# Patient Record
Sex: FEMALE | Race: WHITE | NOT HISPANIC OR LATINO | Employment: OTHER | URBAN - NONMETROPOLITAN AREA
[De-identification: names, ages, dates, MRNs, and addresses within clinical notes are randomized per-mention and may not be internally consistent; named-entity substitution may affect disease eponyms.]

---

## 2023-10-20 LAB
NON-UH HIE ANION GAP: 9.9 (ref 6–15)
NON-UH HIE BASOPHILS # (AUTO): 0.1 10*3/UL (ref 0–0.2)
NON-UH HIE BASOPHILS % (AUTO): 1 %
NON-UH HIE BLOOD UREA NITROGEN: 29 MG/DL (ref 7–25)
NON-UH HIE CARBON DIOXIDE: 30.3 MMOL/L (ref 21–31)
NON-UH HIE CHLORIDE: 104 MMOL/L (ref 98–107)
NON-UH HIE CHOL/HDL RATIO: 2.9
NON-UH HIE CHOLESTEROL: 114 MG/DL (ref 140–200)
NON-UH HIE CREATININE: 1.27 MG/DL (ref 0.6–1.2)
NON-UH HIE EOSINOPHILS # (AUTO): 0.2 10*3/UL (ref 0–0.45)
NON-UH HIE EOSINOPHILS % (AUTO): 3.7 %
NON-UH HIE ESTIMATED GFR: 41.96
NON-UH HIE HDL CHOLESTEROL: 39 MG/DL (ref 23–92)
NON-UH HIE HEMATOCRIT: 34.3 % (ref 34–46.4)
NON-UH HIE HEMOGLOBIN: 11.5 G/DL (ref 11.8–15.4)
NON-UH HIE INR: 1.7
NON-UH HIE LDL CHOLESTEROL,CALCULATED: 53 MG/DL (ref 0–100)
NON-UH HIE LYMPHOCYTES # (AUTO): 1.8 10*3/UL (ref 1–4.8)
NON-UH HIE LYMPHOCYTES % (AUTO): 31.4 %
NON-UH HIE MEAN CORPUSCULAR HEMOGLOBIN: 32 PG (ref 24.7–34.3)
NON-UH HIE MEAN CORPUSCULAR HGB CONC: 33.5 G/DL (ref 32–35)
NON-UH HIE MEAN CORPUSCULAR VOLUME: 95.4 FL (ref 80–100)
NON-UH HIE MEAN PLATELET VOLUME: 8 FL (ref 6.3–10.7)
NON-UH HIE MONOCYTES # (AUTO): 0.7 10*3/UL (ref 0–0.8)
NON-UH HIE MONOCYTES % (AUTO): 13.1 %
NON-UH HIE NEUTROPHILS # (AUTO): 2.9 10*3/UL (ref 1.8–7.7)
NON-UH HIE NEUTROPHILS % (AUTO): 50.8 %
NON-UH HIE NRBC%: 0.1 /100{WBC} (ref 0–0.5)
NON-UH HIE PARTIAL THROMBOPLASTIN TIME: 34.4 S (ref 25.1–36.5)
NON-UH HIE PLATELET COUNT: 197 10*3/UL (ref 150–450)
NON-UH HIE POTASSIUM: 4.2 MMOL/L (ref 3.5–5.1)
NON-UH HIE PROTHROMBIN TIME: 19.9 S (ref 9–12.9)
NON-UH HIE RED BLOOD COUNT: 3.6 (ref 3.6–5)
NON-UH HIE RED CELL DISTRIBUTION WIDTH: 16 % (ref 11.9–15.3)
NON-UH HIE SODIUM: 140 MMOL/L (ref 136–145)
NON-UH HIE TRIGLYCERIDE W/REFLEX: 110 MG/DL (ref 0–149)
NON-UH HIE UNCORRECTED WBC: 5.7 10*3/UL (ref 3.8–11.6)
NON-UH HIE VLDL CHOLESTEROL: 22 MG/DL
NON-UH HIE WHITE BLOOD COUNT: 5.7 10*3/UL (ref 3.8–11.6)

## 2023-10-24 LAB
NON-UH HIE INR: 1.1
NON-UH HIE PARTIAL THROMBOPLASTIN TIME: 29.9 S (ref 25.1–36.5)
NON-UH HIE PROTHROMBIN TIME: 13.6 S (ref 9–12.9)

## 2023-10-27 DIAGNOSIS — I48.21 PERMANENT ATRIAL FIBRILLATION (MULTI): ICD-10-CM

## 2023-10-27 DIAGNOSIS — Z79.899 HIGH RISK MEDICATION USE: ICD-10-CM

## 2023-10-27 PROBLEM — E11.9 DIABETES MELLITUS (MULTI): Status: ACTIVE | Noted: 2023-10-27

## 2023-10-27 PROBLEM — I25.10 ARTERIOSCLEROSIS OF CORONARY ARTERY: Status: ACTIVE | Noted: 2023-10-27

## 2023-10-27 PROBLEM — I47.10 SUPRAVENTRICULAR TACHYCARDIA (CMS-HCC): Status: ACTIVE | Noted: 2023-10-27

## 2023-10-27 PROBLEM — I35.0 NONRHEUMATIC AORTIC VALVE STENOSIS: Status: ACTIVE | Noted: 2023-10-27

## 2023-10-27 PROBLEM — I49.5 SICK SINUS SYNDROME (MULTI): Status: ACTIVE | Noted: 2023-10-27

## 2023-10-27 PROBLEM — N18.30 CKD (CHRONIC KIDNEY DISEASE), STAGE III (MULTI): Status: ACTIVE | Noted: 2023-10-27

## 2023-10-27 PROBLEM — K80.50 CHOLEDOCHOLITHIASIS: Status: ACTIVE | Noted: 2023-10-27

## 2023-10-27 PROBLEM — E78.5 HYPERLIPIDEMIA: Status: ACTIVE | Noted: 2023-10-27

## 2023-10-27 PROBLEM — Z95.0 CARDIAC PACEMAKER IN SITU: Status: ACTIVE | Noted: 2023-10-27

## 2023-10-27 PROBLEM — I27.20 MODERATE PULMONARY HYPERTENSION (MULTI): Status: ACTIVE | Noted: 2023-10-27

## 2023-10-27 PROBLEM — E04.9 GOITER: Status: ACTIVE | Noted: 2023-10-27

## 2023-10-27 PROBLEM — I10 ESSENTIAL HYPERTENSION: Status: ACTIVE | Noted: 2023-10-27

## 2023-10-27 PROBLEM — I34.0 MODERATE MITRAL REGURGITATION: Status: ACTIVE | Noted: 2023-10-27

## 2023-10-27 RX ORDER — ASPIRIN 81 MG/1
81 TABLET ORAL
COMMUNITY

## 2023-10-27 RX ORDER — HYDRALAZINE HYDROCHLORIDE 25 MG/1
1 TABLET, FILM COATED ORAL 2 TIMES DAILY
COMMUNITY
End: 2023-11-20 | Stop reason: ALTCHOICE

## 2023-10-27 RX ORDER — LOSARTAN POTASSIUM 50 MG/1
1 TABLET ORAL 2 TIMES DAILY
COMMUNITY
Start: 2021-02-15 | End: 2023-11-20 | Stop reason: ALTCHOICE

## 2023-10-27 RX ORDER — METFORMIN HYDROCHLORIDE 500 MG/1
500 TABLET ORAL DAILY
COMMUNITY

## 2023-10-27 RX ORDER — WARFARIN SODIUM 4 MG/1
4 TABLET ORAL SEE ADMIN INSTRUCTIONS
COMMUNITY
Start: 2020-05-01

## 2023-10-27 RX ORDER — AMLODIPINE BESYLATE 5 MG/1
5 TABLET ORAL DAILY
COMMUNITY
End: 2023-11-20 | Stop reason: ALTCHOICE

## 2023-10-27 RX ORDER — METOPROLOL TARTRATE 25 MG/1
25 TABLET, FILM COATED ORAL 2 TIMES DAILY
COMMUNITY
End: 2023-11-20 | Stop reason: ALTCHOICE

## 2023-10-27 RX ORDER — GABAPENTIN 100 MG/1
100 CAPSULE ORAL 2 TIMES DAILY
COMMUNITY

## 2023-10-27 RX ORDER — MONTELUKAST SODIUM 10 MG/1
10 TABLET ORAL DAILY
COMMUNITY
Start: 2023-08-21 | End: 2023-11-20 | Stop reason: ALTCHOICE

## 2023-10-27 RX ORDER — LEVOTHYROXINE SODIUM 112 UG/1
112 TABLET ORAL DAILY
COMMUNITY
End: 2023-11-20 | Stop reason: SDUPTHER

## 2023-10-27 RX ORDER — URSODIOL 300 MG/1
300 CAPSULE ORAL 2 TIMES DAILY
COMMUNITY
Start: 2023-10-27

## 2023-10-27 RX ORDER — INDAPAMIDE 1.25 MG/1
1.25 TABLET ORAL DAILY
COMMUNITY
Start: 2023-09-21 | End: 2023-11-20 | Stop reason: ALTCHOICE

## 2023-10-27 RX ORDER — CLONIDINE HYDROCHLORIDE 0.1 MG/1
0.1 TABLET ORAL 2 TIMES DAILY
COMMUNITY
End: 2023-11-20 | Stop reason: ALTCHOICE

## 2023-10-27 RX ORDER — ATORVASTATIN CALCIUM 20 MG/1
1 TABLET, FILM COATED ORAL NIGHTLY
COMMUNITY
Start: 2021-03-04

## 2023-10-30 RX ORDER — LEVOTHYROXINE SODIUM 112 UG/1
112 TABLET ORAL DAILY
Qty: 90 TABLET | Refills: 3 | Status: SHIPPED | OUTPATIENT
Start: 2023-10-30

## 2023-11-20 ENCOUNTER — OFFICE VISIT (OUTPATIENT)
Dept: CARDIOLOGY | Facility: CLINIC | Age: 83
End: 2023-11-20
Payer: MEDICARE

## 2023-11-20 VITALS
BODY MASS INDEX: 32.1 KG/M2 | WEIGHT: 170 LBS | SYSTOLIC BLOOD PRESSURE: 108 MMHG | DIASTOLIC BLOOD PRESSURE: 60 MMHG | HEART RATE: 62 BPM | HEIGHT: 61 IN

## 2023-11-20 DIAGNOSIS — N18.30 STAGE 3 CHRONIC KIDNEY DISEASE, UNSPECIFIED WHETHER STAGE 3A OR 3B CKD (MULTI): ICD-10-CM

## 2023-11-20 DIAGNOSIS — I49.5 SICK SINUS SYNDROME (MULTI): ICD-10-CM

## 2023-11-20 DIAGNOSIS — I48.21 PERMANENT ATRIAL FIBRILLATION (MULTI): ICD-10-CM

## 2023-11-20 DIAGNOSIS — E78.5 HYPERLIPIDEMIA, UNSPECIFIED HYPERLIPIDEMIA TYPE: ICD-10-CM

## 2023-11-20 DIAGNOSIS — I47.10 SUPRAVENTRICULAR TACHYCARDIA (CMS-HCC): ICD-10-CM

## 2023-11-20 DIAGNOSIS — I25.10 ARTERIOSCLEROSIS OF CORONARY ARTERY: ICD-10-CM

## 2023-11-20 DIAGNOSIS — Z95.0 CARDIAC PACEMAKER IN SITU: ICD-10-CM

## 2023-11-20 DIAGNOSIS — I34.0 MODERATE MITRAL REGURGITATION: ICD-10-CM

## 2023-11-20 DIAGNOSIS — I27.20 MODERATE PULMONARY HYPERTENSION (MULTI): ICD-10-CM

## 2023-11-20 DIAGNOSIS — I42.9 CARDIOMYOPATHY, UNSPECIFIED TYPE (MULTI): ICD-10-CM

## 2023-11-20 DIAGNOSIS — I10 ESSENTIAL HYPERTENSION: ICD-10-CM

## 2023-11-20 DIAGNOSIS — I50.22 CHRONIC SYSTOLIC HEART FAILURE (MULTI): Primary | ICD-10-CM

## 2023-11-20 DIAGNOSIS — Z79.899 HIGH RISK MEDICATION USE: ICD-10-CM

## 2023-11-20 PROCEDURE — 99215 OFFICE O/P EST HI 40 MIN: CPT | Performed by: INTERNAL MEDICINE

## 2023-11-20 PROCEDURE — 3078F DIAST BP <80 MM HG: CPT | Performed by: INTERNAL MEDICINE

## 2023-11-20 PROCEDURE — 3074F SYST BP LT 130 MM HG: CPT | Performed by: INTERNAL MEDICINE

## 2023-11-20 PROCEDURE — 1036F TOBACCO NON-USER: CPT | Performed by: INTERNAL MEDICINE

## 2023-11-20 PROCEDURE — 1159F MED LIST DOCD IN RCRD: CPT | Performed by: INTERNAL MEDICINE

## 2023-11-20 RX ORDER — FAMOTIDINE 40 MG/1
1 TABLET, FILM COATED ORAL NIGHTLY
COMMUNITY

## 2023-11-20 RX ORDER — FUROSEMIDE 40 MG/1
40 TABLET ORAL DAILY
COMMUNITY

## 2023-11-20 RX ORDER — CALCIUM CARBONATE 300MG(750)
400 TABLET,CHEWABLE ORAL DAILY
COMMUNITY
End: 2024-01-31 | Stop reason: DRUGHIGH

## 2023-11-20 RX ORDER — SPIRONOLACTONE 25 MG/1
12.5 TABLET ORAL DAILY
COMMUNITY

## 2023-11-20 RX ORDER — SACUBITRIL AND VALSARTAN 49; 51 MG/1; MG/1
1 TABLET, FILM COATED ORAL 2 TIMES DAILY
COMMUNITY
End: 2024-01-31

## 2023-11-20 RX ORDER — DIPHENHYDRAMINE HCL 50 MG
50 CAPSULE ORAL DAILY PRN
COMMUNITY

## 2023-11-20 RX ORDER — METOPROLOL SUCCINATE 100 MG/1
100 TABLET, EXTENDED RELEASE ORAL DAILY
COMMUNITY

## 2023-11-20 RX ORDER — ACETAMINOPHEN 325 MG/1
2 TABLET ORAL 2 TIMES DAILY PRN
COMMUNITY

## 2023-11-20 RX ORDER — ACETAMINOPHEN, DIPHENHYDRAMINE HCL, PHENYLEPHRINE HCL 325; 25; 5 MG/1; MG/1; MG/1
TABLET ORAL AS NEEDED
COMMUNITY
End: 2024-01-31 | Stop reason: DRUGHIGH

## 2023-11-20 NOTE — PROGRESS NOTES
Subjective   Cayla De Leon is a 83 y.o. female       Chief Complaint    Hospital Follow-up          HPI   Patient is in the office after recent admission to the hospital for systolic heart failure.  EF measured 25%.  She was brought to the hospital for outpatient cardiac catheterization but could not lay down on the table.  X-ray showed pulmonary edema and she was treated for heart failure.  Her medical therapy was completely revamped and was discharged in stable condition with resolution of her volume overload.  She has been at [facility] and she is going home in 2 days.  I discussed with the patient and her daughter the need to do what we intended to do in the first place which is right and left cardiac catheterization to find out why she has severe left ventricular systolic dysfunction.  We will plan on doing that in few weeks.  In the meantime medical therapy is well-tolerated and will be left unchanged    ASSESSMENT AND PLAN:      1-permanent atrial fibrillation based on the last pacemaker check. She has sick sinus syndrome for which she had a pacemaker in place since 2020. She is currently anticoagulated with Coumadin.   2. High-risk medication with anticoagulation with no bleeding complications so far.   3. Hypertension, presently under control  4. Hyperlipidemia, on medical therapy with atorvastatin well tolerated. Lipid profile is on target with LDL around 40 mg/dL  5. Class I obesity, she is encouraged to reduce calorie consumption since her activity level is limited, she was reminded that her weight has gone up nearly 12 pounds from last visit  6. Stage III chronic kidney disease, currently stable  7. Mild coronary artery disease, cardiac catheterization 2016 revealed 40% LAD and ramus intermedius disease risk factor modifications have been in place, due to recent development of severe left ventricular systolic dysfunction and the development of significant valvular heart disease, left and right  "cardiac catheterization was recommended.  She will stop the Coumadin several days before the cardiac catheterization  8. Diabetes managed by PCP   9.  Moderate aortic stenosis, will be followed closely  10.  Severe systolic heart failure ejection fraction 25% echocardiogram October 2023 etiology is yet to be determined  11. Status post thyroid surgery on replacement therapy followed by PCP.  12-moderate mitral regurgitation based on echocardiogram 2023 to be monitored  13-moderate pulmonary hypertension, no volume overload   Modesta Carver MD, FACC      Review of Systems   All other systems reviewed and are negative.         Visit Vitals  /60 (BP Location: Left arm, Patient Position: Sitting)   Pulse 62   Ht 1.549 m (5' 1\")   Wt 77.1 kg (170 lb)   BMI 32.12 kg/m²   Smoking Status Never   BSA 1.82 m²        Objective   Physical Exam  Constitutional:       Appearance: Normal appearance. She is normal weight.   HENT:      Nose: Nose normal.   Neck:      Vascular: No carotid bruit.   Cardiovascular:      Rate and Rhythm: Normal rate. Rhythm irregular.      Pulses: Normal pulses.      Heart sounds: Normal heart sounds.   Pulmonary:      Effort: Pulmonary effort is normal.   Abdominal:      General: Bowel sounds are normal.      Palpations: Abdomen is soft.   Genitourinary:     Rectum: Normal.   Musculoskeletal:         General: Normal range of motion.      Cervical back: Normal range of motion.      Right lower leg: No edema.      Left lower leg: No edema.   Skin:     General: Skin is warm and dry.   Neurological:      General: No focal deficit present.      Mental Status: She is alert.   Psychiatric:         Mood and Affect: Mood normal.         Behavior: Behavior normal.         Thought Content: Thought content normal.         Judgment: Judgment normal.         Current Medications    Current Outpatient Medications:     acetaminophen (Tylenol) 325 mg tablet, Take 2 tablets (650 mg) by mouth 2 times a day as " needed for mild pain (1 - 3)., Disp: , Rfl:     aspirin 81 mg EC tablet, Take 1 tablet (81 mg) by mouth 1 (one) time per week., Disp: , Rfl:     atorvastatin (Lipitor) 20 mg tablet, Take 1 tablet (20 mg) by mouth once daily at bedtime., Disp: , Rfl:     diphenhydrAMINE (BENADryl) 50 mg capsule, Take 1 capsule (50 mg) by mouth once daily as needed for itching., Disp: , Rfl:     empagliflozin (Jardiance) 10 mg, Take 1 tablet (10 mg) by mouth once daily., Disp: , Rfl:     famotidine (Pepcid) 40 mg tablet, Take 1 tablet (40 mg) by mouth once daily at bedtime., Disp: , Rfl:     furosemide (Lasix) 40 mg tablet, Take 1 tablet (40 mg) by mouth once daily., Disp: , Rfl:     gabapentin (Neurontin) 100 mg capsule, Take 1 capsule (100 mg) by mouth 3 times a day., Disp: , Rfl:     Jantoven 4 mg tablet, Take 1 tablet (4 mg) by mouth see administration instructions. [place] [facility]p: , Rfl:     levothyroxine (Synthroid, Levoxyl) 112 mcg tablet, TAKE 1 TABLET BY MOUTH ONCE DAILY AS DIRECTED, Disp: 90 tablet, Rfl: 3    magnesium oxide (Mag-Ox) 400 mg tablet, 1 tablet (400 mg) once daily., Disp: , Rfl:     melatonin 10 mg tablet, Take by mouth if needed., Disp: , Rfl:     metFORMIN (Glucophage) 500 mg tablet, Take 1 tablet (500 mg) by mouth once daily. WITH A MEAL, Disp: , Rfl:     metoprolol succinate XL (Toprol-XL) 100 mg 24 hr tablet, Take 1 tablet (100 mg) by mouth once daily. Do not crush or chew., Disp: , Rfl:     multivit-min/ferrous fumarate (MULTI VITAMIN ORAL), Take 1 tablet by mouth once daily., Disp: , Rfl:     sacubitriL-valsartan (Entresto) 49-51 mg tablet, Take 1 tablet by mouth 2 times a day., Disp: , Rfl:     sitaGLIPtin phosphate (Januvia) 100 mg tablet, Take 1 tablet (100 mg) by mouth once daily., Disp: , Rfl:     spironolactone (Aldactone) 25 mg tablet, Take 0.5 tablets (12.5 mg) by mouth once daily., Disp: , Rfl:     ursodiol (Actigall) 300 mg capsule, Take 1 capsule (300 mg) by mouth 2 times a day.,  Disp: , Rfl:                      Assessment/Plan   1. Chronic systolic heart failure (CMS/HCC)        2. Arteriosclerosis of coronary artery  Follow Up In Cardiology      3. Sick sinus syndrome (CMS/HCC)  Follow Up In Cardiology      4. Cardiac pacemaker in situ  Follow Up In Cardiology      5. Permanent atrial fibrillation (CMS/HCC)  Follow Up In Cardiology      6. Supraventricular tachycardia        7. Moderate mitral regurgitation        8. Moderate pulmonary hypertension (CMS/HCC)        9. Essential hypertension        10. Hyperlipidemia, unspecified hyperlipidemia type        11. High risk medication use        12. Stage 3 chronic kidney disease, unspecified whether stage 3a or 3b CKD (CMS/HCC)        13. Cardiomyopathy, unspecified type (CMS/HCC)  Follow Up In Cardiology

## 2023-11-20 NOTE — PATIENT INSTRUCTIONS
Please bring all medicines, vitamins, and herbal supplements with you when you come to the office.    Prescriptions will not be filled unless you are compliant with your follow up appointments or have a follow up appointment scheduled as per instruction of your physician. Refills should be requested at the time of your visit.     Heart cath December- right and left  Coumadin- stop Coumadin 5 days prior  Follow up 6 months

## 2023-11-22 ENCOUNTER — APPOINTMENT (OUTPATIENT)
Dept: CARDIOLOGY | Facility: CLINIC | Age: 83
End: 2023-11-22
Payer: MEDICARE

## 2023-12-15 LAB
NON-UH HIE ANION GAP: 14 (ref 6–15)
NON-UH HIE BASOPHILS # (AUTO): 0.1 10*3/UL (ref 0–0.2)
NON-UH HIE BASOPHILS % (AUTO): 0.9 %
NON-UH HIE BLOOD UREA NITROGEN: 56 MG/DL (ref 7–25)
NON-UH HIE CARBON DIOXIDE: 25.4 MMOL/L (ref 21–31)
NON-UH HIE CHLORIDE: 103 MMOL/L (ref 98–107)
NON-UH HIE CHOL/HDL RATIO: 3.3
NON-UH HIE CHOLESTEROL: 103 MG/DL (ref 140–200)
NON-UH HIE CREATININE: 2.32 MG/DL (ref 0.6–1.2)
NON-UH HIE EOSINOPHILS # (AUTO): 0.3 10*3/UL (ref 0–0.45)
NON-UH HIE EOSINOPHILS % (AUTO): 2.9 %
NON-UH HIE ESTIMATED GFR: 20.36
NON-UH HIE HDL CHOLESTEROL: 31 MG/DL (ref 23–92)
NON-UH HIE HEMATOCRIT: 38.2 % (ref 34–46.4)
NON-UH HIE HEMOGLOBIN: 12.9 G/DL (ref 11.8–15.4)
NON-UH HIE INR: 2.8
NON-UH HIE LDL CHOLESTEROL,CALCULATED: 41 MG/DL (ref 0–100)
NON-UH HIE LYMPHOCYTES # (AUTO): 1.3 10*3/UL (ref 1–4.8)
NON-UH HIE LYMPHOCYTES % (AUTO): 15.2 %
NON-UH HIE MEAN CORPUSCULAR HEMOGLOBIN: 32.4 PG (ref 24.7–34.3)
NON-UH HIE MEAN CORPUSCULAR HGB CONC: 33.8 G/DL (ref 32–35)
NON-UH HIE MEAN CORPUSCULAR VOLUME: 96 FL (ref 80–100)
NON-UH HIE MEAN PLATELET VOLUME: 8.3 FL (ref 6.3–10.7)
NON-UH HIE MONOCYTES # (AUTO): 0.7 10*3/UL (ref 0–0.8)
NON-UH HIE MONOCYTES % (AUTO): 7.8 %
NON-UH HIE NEUTROPHILS # (AUTO): 6.5 10*3/UL (ref 1.8–7.7)
NON-UH HIE NEUTROPHILS % (AUTO): 73.2 %
NON-UH HIE NRBC%: 0.1 /100{WBC} (ref 0–0.5)
NON-UH HIE PARTIAL THROMBOPLASTIN TIME: 34.9 S (ref 25.1–36.5)
NON-UH HIE PLATELET COUNT: 270 10*3/UL (ref 150–450)
NON-UH HIE POTASSIUM: 4.4 MMOL/L (ref 3.5–5.1)
NON-UH HIE PROTHROMBIN TIME: 32.5 S (ref 9–12.9)
NON-UH HIE RED BLOOD COUNT: 3.98 (ref 3.6–5)
NON-UH HIE RED CELL DISTRIBUTION WIDTH: 16.4 % (ref 11.9–15.3)
NON-UH HIE SODIUM: 138 MMOL/L (ref 136–145)
NON-UH HIE TRIGLYCERIDE W/REFLEX: 157 MG/DL (ref 0–149)
NON-UH HIE UNCORRECTED WBC: 8.8 10*3/UL (ref 3.8–11.6)
NON-UH HIE VLDL CHOLESTEROL: 31 MG/DL
NON-UH HIE WHITE BLOOD COUNT: 8.8 10*3/UL (ref 3.8–11.6)

## 2023-12-18 ENCOUNTER — TELEPHONE (OUTPATIENT)
Dept: CARDIOLOGY | Facility: CLINIC | Age: 83
End: 2023-12-18
Payer: MEDICARE

## 2023-12-18 NOTE — TELEPHONE ENCOUNTER
PST LABS RECEIVED. REVIEWED PER DR TRIPP. RESCHEDULE CATH. PATIENT TO ARRIVE 3 HOURS EARLY FOR IV HYDRATION. SPOKE TO PATIENT ORDERS GIVEN. PATIENT  TO CONTINUE TO HOLD COUMADIN.    CREAT- 2.32  INR- 2.8    Rescheduled 12-22 at 1pm.  Patient to arrive at 10am. Message to call office.

## 2024-01-31 ENCOUNTER — OFFICE VISIT (OUTPATIENT)
Dept: CARDIOLOGY | Facility: CLINIC | Age: 84
End: 2024-01-31
Payer: MEDICARE

## 2024-01-31 VITALS
WEIGHT: 170 LBS | HEART RATE: 68 BPM | BODY MASS INDEX: 32.1 KG/M2 | HEIGHT: 61 IN | SYSTOLIC BLOOD PRESSURE: 104 MMHG | DIASTOLIC BLOOD PRESSURE: 62 MMHG

## 2024-01-31 DIAGNOSIS — Z95.0 CARDIAC PACEMAKER IN SITU: ICD-10-CM

## 2024-01-31 DIAGNOSIS — I49.5 SICK SINUS SYNDROME (MULTI): ICD-10-CM

## 2024-01-31 DIAGNOSIS — E78.5 HYPERLIPIDEMIA, UNSPECIFIED HYPERLIPIDEMIA TYPE: ICD-10-CM

## 2024-01-31 DIAGNOSIS — I25.10 ARTERIOSCLEROSIS OF CORONARY ARTERY: ICD-10-CM

## 2024-01-31 DIAGNOSIS — I48.21 PERMANENT ATRIAL FIBRILLATION (MULTI): ICD-10-CM

## 2024-01-31 DIAGNOSIS — I35.0 NONRHEUMATIC AORTIC VALVE STENOSIS: ICD-10-CM

## 2024-01-31 DIAGNOSIS — N18.30 STAGE 3 CHRONIC KIDNEY DISEASE, UNSPECIFIED WHETHER STAGE 3A OR 3B CKD (MULTI): ICD-10-CM

## 2024-01-31 DIAGNOSIS — I50.22 CHRONIC SYSTOLIC HEART FAILURE (MULTI): ICD-10-CM

## 2024-01-31 DIAGNOSIS — I42.9 CARDIOMYOPATHY, UNSPECIFIED TYPE (MULTI): ICD-10-CM

## 2024-01-31 DIAGNOSIS — I10 ESSENTIAL HYPERTENSION: ICD-10-CM

## 2024-01-31 PROCEDURE — 99214 OFFICE O/P EST MOD 30 MIN: CPT | Performed by: INTERNAL MEDICINE

## 2024-01-31 PROCEDURE — 3074F SYST BP LT 130 MM HG: CPT | Performed by: INTERNAL MEDICINE

## 2024-01-31 PROCEDURE — 3078F DIAST BP <80 MM HG: CPT | Performed by: INTERNAL MEDICINE

## 2024-01-31 PROCEDURE — 1036F TOBACCO NON-USER: CPT | Performed by: INTERNAL MEDICINE

## 2024-01-31 RX ORDER — INSULIN DEGLUDEC 200 U/ML
INJECTION, SOLUTION SUBCUTANEOUS
COMMUNITY
Start: 2024-01-11

## 2024-01-31 RX ORDER — VALSARTAN 40 MG/1
40 TABLET ORAL DAILY
Qty: 90 TABLET | Refills: 3 | Status: SHIPPED | OUTPATIENT
Start: 2024-01-31 | End: 2025-01-30

## 2024-01-31 RX ORDER — DIPHENHYDRAMINE HCL 25 MG
25 TABLET ORAL AS NEEDED
COMMUNITY

## 2024-01-31 RX ORDER — MAGNESIUM 250 MG
1 TABLET ORAL DAILY
COMMUNITY

## 2024-01-31 ASSESSMENT — ENCOUNTER SYMPTOMS: LIGHT-HEADEDNESS: 1

## 2024-01-31 NOTE — PATIENT INSTRUCTIONS
Please bring all medicines, vitamins, and herbal supplements with you when you come to the office.    Prescriptions will not be filled unless you are compliant with your follow up appointments or have a follow up appointment scheduled as per instruction of your physician. Refills should be requested at the time of your visit.      Stop Entresto  Start Valsartan 40 mg daily  Follow up 6 months

## 2024-01-31 NOTE — PROGRESS NOTES
Subjective   Cayla De Leon is a 83 y.o. female       Chief Complaint    Follow-up          HPI   Patient is in the office with her daughter for follow-up for having cardiac catheterization last month.  Her right heart catheterization revealed only mild pulmonary hypertension left heart catheterization revealed minimal coronary artery disease ejection fraction 50%.  The patient is doing much better than previously and denies any dyspnea orthopnea PND or lower extremity edema.  Her examination today was only remarkable for obesity and a murmur consistent with aortic stenosis.  The findings of the cardiac catheterization was shared with the patient and her daughter.    ASSESSMENT AND PLAN:      1-permanent atrial fibrillation based on the last pacemaker check. She has sick sinus syndrome for which she had a pacemaker in place since 2020. She is currently anticoagulated with Coumadin.   2. High-risk medication with anticoagulation with no bleeding complications so far.   3. Hypertension, presently under control  4. Hyperlipidemia, on medical therapy with atorvastatin well tolerated. Lipid profile is on target with LDL around 40 mg/dL  5. Class I obesity, she is encouraged to reduce calorie consumption since her activity level is limited, she was reminded that her weight has gone up nearly 12 pounds from last visit  6. Stage III chronic kidney disease, currently stable  7. Mild coronary artery disease, cardiac catheterization December 20243, continue baby aspirin once a week and statin therapy  8. Diabetes managed by PCP currently under control  9.  Moderate aortic stenosis, will be followed closely  10.  Mild LV systolic dysfunction, EF 50% by cardiac catheterization December 2023.  She cannot afford Entresto therefore she will be placed on valsartan 40 mg daily.  11. Status post thyroid surgery on replacement therapy followed by PCP.    Modesta Carver MD, FACC   Review of Systems   Neurological:  Positive for  "light-headedness.          Visit Vitals  /62 (BP Location: Left arm, Patient Position: Sitting)   Pulse 68   Ht 1.549 m (5' 1\")   Wt 77.1 kg (170 lb)   BMI 32.12 kg/m²   Smoking Status Never   BSA 1.82 m²        Objective   Physical Exam  Constitutional:       Appearance: Normal appearance. She is normal weight.   HENT:      Nose: Nose normal.   Neck:      Vascular: No carotid bruit.   Cardiovascular:      Rate and Rhythm: Normal rate.      Pulses: Normal pulses.      Heart sounds: Murmur heard.      Systolic murmur is present with a grade of 2/6.   Pulmonary:      Effort: Pulmonary effort is normal.   Abdominal:      General: Bowel sounds are normal.      Palpations: Abdomen is soft.   Genitourinary:     Rectum: Normal.   Musculoskeletal:         General: Normal range of motion.      Cervical back: Normal range of motion.      Right lower leg: No edema.      Left lower leg: No edema.   Skin:     General: Skin is warm and dry.   Neurological:      General: No focal deficit present.      Mental Status: She is alert.   Psychiatric:         Mood and Affect: Mood normal.         Behavior: Behavior normal.         Thought Content: Thought content normal.         Judgment: Judgment normal.         Current Medications    Current Outpatient Medications:     acetaminophen (Tylenol) 325 mg tablet, Take 2 tablets (650 mg) by mouth 2 times a day as needed for mild pain (1 - 3)., Disp: , Rfl:     aspirin 81 mg EC tablet, Take 1 tablet (81 mg) by mouth 1 (one) time per week., Disp: , Rfl:     atorvastatin (Lipitor) 20 mg tablet, Take 1 tablet (20 mg) by mouth once daily at bedtime., Disp: , Rfl:     diphenhydrAMINE (Allergy) 25 mg tablet, Take 1 tablet (25 mg) by mouth if needed for sleep., Disp: , Rfl:     diphenhydrAMINE (BENADryl) 50 mg capsule, Take 1 capsule (50 mg) by mouth once daily as needed for itching., Disp: , Rfl:     famotidine (Pepcid) 40 mg tablet, Take 1 tablet (40 mg) by mouth once daily at bedtime., Disp: " , Rfl:     furosemide (Lasix) 40 mg tablet, Take 1 tablet (40 mg) by mouth once daily., Disp: , Rfl:     gabapentin (Neurontin) 100 mg capsule, Take 1 capsule (100 mg) by mouth 2 times a day., Disp: , Rfl:     Jantoven 4 mg tablet, Take 1 tablet (4 mg) by mouth see administration instructions. As directed per [facility], Disp: , Rfl:     levothyroxine (Synthroid, Levoxyl) 112 mcg tablet, TAKE 1 TABLET BY MOUTH ONCE DAILY AS DIRECTED, Disp: 90 tablet, Rfl: 3    magnesium 250 mg tablet, Take 1 tablet (250 mg) by mouth once daily., Disp: , Rfl:     metFORMIN (Glucophage) 500 mg tablet, Take 1 tablet (500 mg) by mouth once daily. WITH A MEAL, Disp: , Rfl:     metoprolol succinate XL (Toprol-XL) 100 mg 24 hr tablet, Take 1 tablet (100 mg) by mouth once daily. Do not crush or chew., Disp: , Rfl:     multivit-min/ferrous fumarate (MULTI VITAMIN ORAL), Take 1 tablet by mouth once daily., Disp: , Rfl:     spironolactone (Aldactone) 25 mg tablet, Take 0.5 tablets (12.5 mg) by mouth once daily., Disp: , Rfl:     Tresiba FlexTouch U-200 200 unit/mL (3 mL) injection, INJECT 10 - 20 UNITS SUBCUTANEOUSLY ONCE DAILY, Disp: , Rfl:     ursodiol (Actigall) 300 mg capsule, Take 1 capsule (300 mg) by mouth 2 times a day., Disp: , Rfl:     valsartan (Diovan) 40 mg tablet, Take 1 tablet (40 mg) by mouth once daily., Disp: 90 tablet, Rfl: 3                     Assessment/Plan   1. Chronic systolic heart failure (CMS/HCC)  valsartan (Diovan) 40 mg tablet    Follow Up In Cardiology      2. Cardiomyopathy, unspecified type (CMS/HCC)        3. Arteriosclerosis of coronary artery        4. Sick sinus syndrome (CMS/HCC)        5. Cardiac pacemaker in situ        6. Essential hypertension        7. Permanent atrial fibrillation (CMS/HCC)        8. Hyperlipidemia, unspecified hyperlipidemia type        9. Nonrheumatic aortic valve stenosis        10. Moderate mitral regurgitation        11. Moderate pulmonary hypertension (CMS/HCC)         12. Stage 3 chronic kidney disease, unspecified whether stage 3a or 3b CKD (CMS/HCC)           Scribe Attestation  By signing my name below, Ipatsy Scribe   attest that this documentation has been prepared under the direction and in the presence of Modesta Carver MD.

## 2024-01-31 NOTE — LETTER
January 31, 2024     Amish Bartlett DO  [address]  [facility]  [place] [address]    Patient: Cayla De Leon   YOB: 1940   Date of Visit: 1/31/2024       Dear Dr. Amish Bartlett, :    Thank you for referring Cayla De Leon to me for evaluation. Below are my notes for this consultation.  If you have questions, please do not hesitate to call me. I look forward to following your patient along with you.       Sincerely,     Modesta Carver MD      CC: No Recipients  ______________________________________________________________________________________    Subjective   Cayla De Leon is a 83 y.o. female       Chief Complaint    Follow-up          HPI   Patient is in the office with her daughter for follow-up for having cardiac catheterization last month.  Her right heart catheterization revealed only mild pulmonary hypertension left heart catheterization revealed minimal coronary artery disease ejection fraction 50%.  The patient is doing much better than previously and denies any dyspnea orthopnea PND or lower extremity edema.  Her examination today was only remarkable for obesity and a murmur consistent with aortic stenosis.  The findings of the cardiac catheterization was shared with the patient and her daughter.    ASSESSMENT AND PLAN:      1-permanent atrial fibrillation based on the last pacemaker check. She has sick sinus syndrome for which she had a pacemaker in place since 2020. She is currently anticoagulated with Coumadin.   2. High-risk medication with anticoagulation with no bleeding complications so far.   3. Hypertension, presently under control  4. Hyperlipidemia, on medical therapy with atorvastatin well tolerated. Lipid profile is on target with LDL around 40 mg/dL  5. Class I obesity, she is encouraged to reduce calorie consumption since her activity level is limited, she was reminded that her weight has gone up nearly 12 pounds from last visit  6.  "Stage III chronic kidney disease, currently stable  7. Mild coronary artery disease, cardiac catheterization December 20243, continue baby aspirin once a week and statin therapy  8. Diabetes managed by PCP currently under control  9.  Moderate aortic stenosis, will be followed closely  10.  Mild LV systolic dysfunction, EF 50% by cardiac catheterization December 2023.  She cannot afford Entresto therefore she will be placed on valsartan 40 mg daily.  11. Status post thyroid surgery on replacement therapy followed by PCP.    Modesta Carver MD, [facility]   Review of Systems   Neurological:  Positive for light-headedness.          Visit Vitals  /62 (BP Location: Left arm, Patient Position: Sitting)   Pulse 68   Ht 1.549 m (5' 1\")   Wt 77.1 kg (170 lb)   BMI 32.12 kg/m²   Smoking Status Never   BSA 1.82 m²        Objective   Physical Exam  Constitutional:       Appearance: Normal appearance. She is normal weight.   HENT:      Nose: Nose normal.   Neck:      Vascular: No carotid bruit.   Cardiovascular:      Rate and Rhythm: Normal rate.      Pulses: Normal pulses.      Heart sounds: Murmur heard.      Systolic murmur is present with a grade of 2/6.   Pulmonary:      Effort: Pulmonary effort is normal.   Abdominal:      General: Bowel sounds are normal.      Palpations: Abdomen is soft.   Genitourinary:     Rectum: Normal.   Musculoskeletal:         General: Normal range of motion.      Cervical back: Normal range of motion.      Right lower leg: No edema.      Left lower leg: No edema.   Skin:     General: Skin is warm and dry.   Neurological:      General: No focal deficit present.      Mental Status: She is alert.   Psychiatric:         Mood and Affect: Mood normal.         Behavior: Behavior normal.         Thought Content: Thought content normal.         Judgment: Judgment normal.         Current Medications    Current Outpatient Medications:   •  acetaminophen (Tylenol) 325 mg tablet, Take 2 tablets (650 mg) " by mouth 2 times a day as needed for mild pain (1 - 3)., Disp: , Rfl:   •  aspirin 81 mg EC tablet, Take 1 tablet (81 mg) by mouth 1 (one) time per week., Disp: , Rfl:   •  atorvastatin (Lipitor) 20 mg tablet, Take 1 tablet (20 mg) by mouth once daily at bedtime., Disp: , Rfl:   •  diphenhydrAMINE (Allergy) 25 mg tablet, Take 1 tablet (25 mg) by mouth if needed for sleep., Disp: , Rfl:   •  diphenhydrAMINE (BENADryl) 50 mg capsule, Take 1 capsule (50 mg) by mouth once daily as needed for itching., Disp: , Rfl:   •  famotidine (Pepcid) 40 mg tablet, Take 1 tablet (40 mg) by mouth once daily at bedtime., Disp: , Rfl:   •  furosemide (Lasix) 40 mg tablet, Take 1 tablet (40 mg) by mouth once daily., Disp: , Rfl:   •  gabapentin (Neurontin) 100 mg capsule, Take 1 capsule (100 mg) by mouth 2 times a day., Disp: , Rfl:   •  Jantoven 4 mg tablet, Take 1 tablet (4 mg) by mouth see administration instructions. As directed per [facility], Disp: , Rfl:   •  levothyroxine (Synthroid, Levoxyl) 112 mcg tablet, TAKE 1 TABLET BY MOUTH ONCE DAILY AS DIRECTED, Disp: 90 tablet, Rfl: 3  •  magnesium 250 mg tablet, Take 1 tablet (250 mg) by mouth once daily., Disp: , Rfl:   •  metFORMIN (Glucophage) 500 mg tablet, Take 1 tablet (500 mg) by mouth once daily. WITH A MEAL, Disp: , Rfl:   •  metoprolol succinate XL (Toprol-XL) 100 mg 24 hr tablet, Take 1 tablet (100 mg) by mouth once daily. Do not crush or chew., Disp: , Rfl:   •  multivit-min/ferrous fumarate (MULTI VITAMIN ORAL), Take 1 tablet by mouth once daily., Disp: , Rfl:   •  spironolactone (Aldactone) 25 mg tablet, Take 0.5 tablets (12.5 mg) by mouth once daily., Disp: , Rfl:   •  Tresiba FlexTouch U-200 200 unit/mL (3 mL) injection, INJECT 10 - 20 UNITS SUBCUTANEOUSLY ONCE DAILY, Disp: , Rfl:   •  ursodiol (Actigall) 300 mg capsule, Take 1 capsule (300 mg) by mouth 2 times a day., Disp: , Rfl:   •  valsartan (Diovan) 40 mg tablet, Take 1 tablet (40 mg) by mouth once  daily., Disp: 90 tablet, Rfl: 3                     Assessment/Plan   1. Chronic systolic heart failure (CMS/HCC)  valsartan (Diovan) 40 mg tablet    Follow Up In Cardiology      2. Cardiomyopathy, unspecified type (CMS/HCC)        3. Arteriosclerosis of coronary artery        4. Sick sinus syndrome (CMS/HCC)        5. Cardiac pacemaker in situ        6. Essential hypertension        7. Permanent atrial fibrillation (CMS/HCC)        8. Hyperlipidemia, unspecified hyperlipidemia type        9. Nonrheumatic aortic valve stenosis        10. Moderate mitral regurgitation        11. Moderate pulmonary hypertension (CMS/HCC)        12. Stage 3 chronic kidney disease, unspecified whether stage 3a or 3b CKD (CMS/HCC)           Scribe Attestation  By signing my name below, I [email or web address] , Scrrajiv   attest that this documentation has been prepared under the direction and in the presence of Modesta Carver MD.

## 2024-06-05 ENCOUNTER — APPOINTMENT (OUTPATIENT)
Dept: CARDIOLOGY | Facility: CLINIC | Age: 84
End: 2024-06-05
Payer: MEDICARE

## 2024-06-14 DIAGNOSIS — E78.2 MIXED HYPERLIPIDEMIA: ICD-10-CM

## 2024-06-14 DIAGNOSIS — Z79.899 HIGH RISK MEDICATION USE: ICD-10-CM

## 2024-06-14 DIAGNOSIS — I10 ESSENTIAL HYPERTENSION: ICD-10-CM

## 2024-06-14 DIAGNOSIS — I48.21 PERMANENT ATRIAL FIBRILLATION (MULTI): ICD-10-CM

## 2024-06-14 NOTE — TELEPHONE ENCOUNTER
Patient phoned for refills of medications. Stated the Clonidine and Amlodipine were discontinued in the fall of 2023 by Dr. Modesta Carver MD. She was recently inpatient for a right hip fracture and went to [facility] rehab where they reinstated both those medications. She is wanting refills of both. States was on Clonidine 0.1mg daily and Amlodipine 5mg daily. Please advise.   She is asking for refill for Synthroid to which is ready for signature.     To Dr. Modesta Carver MD

## 2024-06-18 RX ORDER — LEVOTHYROXINE SODIUM 112 UG/1
112 TABLET ORAL DAILY
Qty: 90 TABLET | Refills: 3 | Status: SHIPPED | OUTPATIENT
Start: 2024-06-18 | End: 2025-06-18

## 2024-06-18 RX ORDER — AMLODIPINE BESYLATE 5 MG/1
5 TABLET ORAL DAILY
Qty: 90 TABLET | Refills: 3 | Status: SHIPPED | OUTPATIENT
Start: 2024-06-18 | End: 2025-06-18

## 2024-06-18 RX ORDER — ATORVASTATIN CALCIUM 20 MG/1
20 TABLET, FILM COATED ORAL NIGHTLY
Qty: 90 TABLET | Refills: 3 | Status: SHIPPED | OUTPATIENT
Start: 2024-06-18 | End: 2025-06-18

## 2024-06-18 RX ORDER — CLONIDINE HYDROCHLORIDE 0.1 MG/1
0.1 TABLET ORAL DAILY
Qty: 90 TABLET | Refills: 3 | Status: SHIPPED | OUTPATIENT
Start: 2024-06-18 | End: 2025-06-18

## 2024-06-18 NOTE — TELEPHONE ENCOUNTER
Patient called back advised, needs rx's sent. Rx prepped.   Also requesting additional refills on active meds

## 2024-07-31 ENCOUNTER — TELEPHONE (OUTPATIENT)
Dept: CARDIOLOGY | Facility: CLINIC | Age: 84
End: 2024-07-31

## 2024-07-31 ENCOUNTER — APPOINTMENT (OUTPATIENT)
Dept: CARDIOLOGY | Facility: CLINIC | Age: 84
End: 2024-07-31
Payer: MEDICARE

## 2024-07-31 VITALS
BODY MASS INDEX: 30.4 KG/M2 | SYSTOLIC BLOOD PRESSURE: 132 MMHG | HEART RATE: 74 BPM | DIASTOLIC BLOOD PRESSURE: 80 MMHG | WEIGHT: 161 LBS | HEIGHT: 61 IN

## 2024-07-31 DIAGNOSIS — I34.0 MODERATE MITRAL REGURGITATION: ICD-10-CM

## 2024-07-31 DIAGNOSIS — E78.5 HYPERLIPIDEMIA, UNSPECIFIED HYPERLIPIDEMIA TYPE: ICD-10-CM

## 2024-07-31 DIAGNOSIS — I10 ESSENTIAL HYPERTENSION: ICD-10-CM

## 2024-07-31 DIAGNOSIS — I42.9 CARDIOMYOPATHY, UNSPECIFIED TYPE (MULTI): ICD-10-CM

## 2024-07-31 DIAGNOSIS — Z95.0 CARDIAC PACEMAKER IN SITU: ICD-10-CM

## 2024-07-31 DIAGNOSIS — I48.21 PERMANENT ATRIAL FIBRILLATION (MULTI): ICD-10-CM

## 2024-07-31 DIAGNOSIS — I50.22 CHRONIC SYSTOLIC HEART FAILURE (MULTI): ICD-10-CM

## 2024-07-31 DIAGNOSIS — I35.0 NONRHEUMATIC AORTIC VALVE STENOSIS: ICD-10-CM

## 2024-07-31 DIAGNOSIS — I25.10 ARTERIOSCLEROSIS OF CORONARY ARTERY: ICD-10-CM

## 2024-07-31 DIAGNOSIS — I27.20 MODERATE PULMONARY HYPERTENSION (MULTI): ICD-10-CM

## 2024-07-31 DIAGNOSIS — Z78.9 NEVER SMOKED TOBACCO: ICD-10-CM

## 2024-07-31 DIAGNOSIS — I49.5 SICK SINUS SYNDROME (MULTI): ICD-10-CM

## 2024-07-31 PROCEDURE — 3075F SYST BP GE 130 - 139MM HG: CPT | Performed by: INTERNAL MEDICINE

## 2024-07-31 PROCEDURE — 1159F MED LIST DOCD IN RCRD: CPT | Performed by: INTERNAL MEDICINE

## 2024-07-31 PROCEDURE — 1036F TOBACCO NON-USER: CPT | Performed by: INTERNAL MEDICINE

## 2024-07-31 PROCEDURE — 99214 OFFICE O/P EST MOD 30 MIN: CPT | Performed by: INTERNAL MEDICINE

## 2024-07-31 PROCEDURE — 3078F DIAST BP <80 MM HG: CPT | Performed by: INTERNAL MEDICINE

## 2024-07-31 RX ORDER — GABAPENTIN 300 MG/1
300 CAPSULE ORAL NIGHTLY
COMMUNITY

## 2024-07-31 RX ORDER — VALSARTAN 40 MG/1
40 TABLET ORAL 2 TIMES DAILY
Qty: 180 TABLET | Refills: 3 | Status: SHIPPED | OUTPATIENT
Start: 2024-07-31 | End: 2025-07-31

## 2024-07-31 RX ORDER — WARFARIN 1 MG/1
1 TABLET ORAL
COMMUNITY

## 2024-07-31 ASSESSMENT — ENCOUNTER SYMPTOMS: DYSPNEA ON EXERTION: 1

## 2024-07-31 NOTE — LETTER
July 31, 2024     Amish Bartlett DO  [address]  [facility]  [place] [address]    Patient: Cayla De Leon   YOB: 1940   Date of Visit: 7/31/2024       Dear Dr. Amish Bartlett, :    Thank you for referring Cayla De Leon to me for evaluation. Below are my notes for this consultation.  If you have questions, please do not hesitate to call me. I look forward to following your patient along with you.       Sincerely,     Modesta Carver MD      CC: No Recipients  ______________________________________________________________________________________    Subjective   Cayla De Leon is a 84 y.o. female       Chief Complaint    Follow-up          HPI   Patient is in the office accompanied by her daughter after recent admission to [facility] for Coumadin toxicity without any bleeding complications but by the way was found to have slight elevation of the BNP and a troponin for which she was admitted to the hospital and had an echocardiogram which revealed ejection fraction of 35% with severe aortic stenosis and a peak velocity of 416 cm/s which is an increase from her last study from October 2023.  She had a cardiac catheterization December 2023 by myself which revealed no significant coronary artery disease.  The patient has symptoms of fatigue dyspnea on exertion and exercise intolerance due to the combination of aortic stenosis and the left ventricular systolic dysfunction.  She was advised to get a TAVR and will be formally referred to structural heart disease at [facility].  Today her examination was remarkable for clear lungs and no lower extremity edema, she has chronic atrial fibrillation with irregular rhythm that is under control and has a systolic murmur consistent with aortic stenosis.  Renal function is normal and she has no underlying pulmonary disease.  No previous strokes.  Her pacemaker has been functioning properly.  She is currently  "anticoagulated    ASSESSMENT AND PLAN:      1-permanent atrial fibrillation based on the last pacemaker check. She has sick sinus syndrome for which she had a pacemaker in place since 2020. She is currently anticoagulated with Coumadin.   2.  Severe aortic stenosis based on echocardiogram July 2024 with velocity exceeding 416 cm/s.  The patient qualifies for valve replacement and will likely benefit from a TAVR.  She will be referred to  structural heart disease for evaluation, since she had a cardiac catheterization in December 2023 I do not think that they would request another cardiac cath  3. Hypertension, presently under control  4. Hyperlipidemia, on medical therapy with atorvastatin well tolerated. Lipid profile is on target with LDL around 40 mg/dL  5.  Severe left ventricular systolic dysfunction ejection fraction 30-35% by echo July 2024.  Will increase valsartan up to 40 mg twice daily while will leave her on the rest of her medications.  Expect improvement after TAVR  6.  Generalized fatigue and lack of stamina related to valvular heart disease and left ventricular systolic dysfunction, expect improvement following valve replacement  7. Mild coronary artery disease, cardiac catheterization December 20243, continue baby aspirin once a week and statin therapy  8.  Type II diabetes managed by PCP currently under control  9. Status post thyroid surgery on replacement therapy followed by PCP.  10.  Status post pacemaker followed at the pacemaker clinic at [facility]  Review of Systems   Constitutional: Positive for malaise/fatigue.   Cardiovascular:  Positive for dyspnea on exertion.   All other systems reviewed and are negative.           Vitals:    07/31/24 1047 07/31/24 1129   BP: 142/70 132/80   BP Location: Right arm    Patient Position: Sitting    Pulse: 74    Weight: 73 kg (161 lb)    Height: 1.549 m (5' 1\")         Objective   Physical Exam  Constitutional:       Appearance: Normal appearance. "   HENT:      Nose: Nose normal.   Neck:      Vascular: No carotid bruit.   Cardiovascular:      Rate and Rhythm: Normal rate. Rhythm irregularly irregular.      Pulses: Normal pulses.      Heart sounds: Murmur heard.      Systolic murmur is present with a grade of 2/6.   Pulmonary:      Effort: Pulmonary effort is normal.   Abdominal:      General: Bowel sounds are normal.      Palpations: Abdomen is soft.   Musculoskeletal:         General: Normal range of motion.      Cervical back: Normal range of motion.      Right lower leg: No edema.      Left lower leg: No edema.   Skin:     General: Skin is warm and dry.   Neurological:      General: No focal deficit present.      Mental Status: She is alert.   Psychiatric:         Mood and Affect: Mood normal.         Behavior: Behavior normal.         Thought Content: Thought content normal.         Judgment: Judgment normal.         Allergies  Amiodarone, Codeine, Sulfamethoxazole, and Tramadol     Current Medications    Current Outpatient Medications:   •  acetaminophen (Tylenol) 325 mg tablet, Take 2 tablets (650 mg) by mouth 2 times a day as needed for mild pain (1 - 3)., Disp: , Rfl:   •  amLODIPine (Norvasc) 5 mg tablet, Take 1 tablet (5 mg) by mouth once daily., Disp: 90 tablet, Rfl: 3  •  aspirin 81 mg EC tablet, Take 1 tablet (81 mg) by mouth 1 (one) time per week., Disp: , Rfl:   •  atorvastatin (Lipitor) 20 mg tablet, Take 1 tablet (20 mg) by mouth once daily at bedtime., Disp: 90 tablet, Rfl: 3  •  cloNIDine (Catapres) 0.1 mg tablet, Take 1 tablet (0.1 mg) by mouth early in the morning.., Disp: 90 tablet, Rfl: 3  •  diphenhydrAMINE (BENADryl) 50 mg capsule, Take 1 capsule (50 mg) by mouth once daily as needed for itching., Disp: , Rfl:   •  famotidine (Pepcid) 40 mg tablet, Take 1 tablet (40 mg) by mouth once daily at bedtime., Disp: , Rfl:   •  furosemide (Lasix) 40 mg tablet, Take 1 tablet (40 mg) by mouth once daily., Disp: , Rfl:   •  gabapentin (Neurontin)  300 mg capsule, Take 1 capsule (300 mg) by mouth once daily at bedtime., Disp: , Rfl:   •  levothyroxine (Synthroid, Levoxyl) 112 mcg tablet, Take 1 tablet (112 mcg) by mouth once daily. as directed, Disp: 90 tablet, Rfl: 3  •  metoprolol succinate XL (Toprol-XL) 100 mg 24 hr tablet, Take 1 tablet (100 mg) by mouth once daily. Do not crush or chew., Disp: , Rfl:   •  multivit-min/ferrous fumarate (MULTI VITAMIN ORAL), Take 1 tablet by mouth once daily., Disp: , Rfl:   •  spironolactone (Aldactone) 25 mg tablet, Take 0.5 tablets (12.5 mg) by mouth once daily., Disp: , Rfl:   •  Tresiba FlexTouch U-200 200 unit/mL (3 mL) injection, INJECT 10 - 20 UNITS SUBCUTANEOUSLY ONCE DAILY, Disp: , Rfl:   •  ursodiol (Actigall) 300 mg capsule, Take 1 capsule (300 mg) by mouth 2 times a day., Disp: , Rfl:   •  warfarin (Coumadin) 1 mg tablet, Take 1 tablet (1 mg) by mouth. Take as directed per After Visit Summary., Disp: , Rfl:   •  valsartan (Diovan) 40 mg tablet, Take 1 tablet (40 mg) by mouth 2 times a day., Disp: 180 tablet, Rfl: 3                     Assessment/Plan   1. Chronic systolic heart failure (Multi)  Follow Up In Cardiology      2. Cardiomyopathy, unspecified type (Multi)        3. Nonrheumatic aortic valve stenosis  Follow Up In Cardiology    Referral to Valve and Structural Heart Program      4. Moderate mitral regurgitation        5. Moderate pulmonary hypertension (Multi)        6. Arteriosclerosis of coronary artery        7. Permanent atrial fibrillation (Multi)        8. Sick sinus syndrome (Multi)        9. Cardiac pacemaker in situ        10. Supraventricular tachycardia (CMS-HCC)        11. Essential hypertension  valsartan (Diovan) 40 mg tablet      12. Hyperlipidemia, unspecified hyperlipidemia type        13. Stage 3 chronic kidney disease, unspecified whether stage 3a or 3b CKD (Multi)        14. Never smoked tobacco                 Scribe Attestation  By signing my name below, Kalie MCFADDEN LPN   ,  Scribe   attest that this documentation has been prepared under the direction and in the presence of Modesta Carver MD.     Provider Attestation - Scribe documentation    All medical record entries made by the Scribe were at my direction and personally dictated by me. I have reviewed the chart and agree that the record accurately reflects my personal performance of the history, physical exam, discussion and plan.

## 2024-07-31 NOTE — PATIENT INSTRUCTIONS
Please bring all medicines, vitamins, and herbal supplements with you when you come to the office.    Prescriptions will not be filled unless you are compliant with your follow up appointments or have a follow up appointment scheduled as per instruction of your physician. Refills should be requested at the time of your visit.     BMI was above normal measurement. Current weight: 73 kg (161 lb)  Weight change since last visit (-) denotes wt loss -9 lbs   Weight loss needed to achieve BMI 25: 29 Lbs  Weight loss needed to achieve BMI 30: 2.6 Lbs  Provided instructions on dietary changes.    Referral for TAVR  Follow up 6 months  Valsartan 40 mg one tablet two times daily

## 2024-07-31 NOTE — PROGRESS NOTES
Subjective   Cayla De Leon is a 84 y.o. female       Chief Complaint    Follow-up          HPI   Patient is in the office accompanied by her daughter after recent admission to [facility] for Coumadin toxicity without any bleeding complications but by the way was found to have slight elevation of the BNP and a troponin for which she was admitted to the hospital and had an echocardiogram which revealed ejection fraction of 35% with severe aortic stenosis and a peak velocity of 416 cm/s which is an increase from her last study from October 2023.  She had a cardiac catheterization December 2023 by myself which revealed no significant coronary artery disease.  The patient has symptoms of fatigue dyspnea on exertion and exercise intolerance due to the combination of aortic stenosis and the left ventricular systolic dysfunction.  She was advised to get a TAVR and will be formally referred to structural heart disease at [facility].  Today her examination was remarkable for clear lungs and no lower extremity edema, she has chronic atrial fibrillation with irregular rhythm that is under control and has a systolic murmur consistent with aortic stenosis.  Renal function is normal and she has no underlying pulmonary disease.  No previous strokes.  Her pacemaker has been functioning properly.  She is currently anticoagulated    ASSESSMENT AND PLAN:      1-permanent atrial fibrillation based on the last pacemaker check. She has sick sinus syndrome for which she had a pacemaker in place since 2020. She is currently anticoagulated with Coumadin.   2.  Severe aortic stenosis based on echocardiogram July 2024 with velocity exceeding 416 cm/s.  The patient qualifies for valve replacement and will likely benefit from a TAVR.  She will be referred to  structural heart disease for evaluation, since she had a cardiac catheterization in December 2023 I do not think that they would request another cardiac cath  3. Hypertension,  "presently under control  4. Hyperlipidemia, on medical therapy with atorvastatin well tolerated. Lipid profile is on target with LDL around 40 mg/dL  5.  Severe left ventricular systolic dysfunction ejection fraction 30-35% by echo July 2024.  Will increase valsartan up to 40 mg twice daily while will leave her on the rest of her medications.  Expect improvement after TAVR  6.  Generalized fatigue and lack of stamina related to valvular heart disease and left ventricular systolic dysfunction, expect improvement following valve replacement  7. Mild coronary artery disease, cardiac catheterization December 20243, continue baby aspirin once a week and statin therapy  8.  Type II diabetes managed by PCP currently under control  9. Status post thyroid surgery on replacement therapy followed by PCP.  10.  Status post pacemaker followed at the pacemaker clinic at [facility]  Review of Systems   Constitutional: Positive for malaise/fatigue.   Cardiovascular:  Positive for dyspnea on exertion.   All other systems reviewed and are negative.           Vitals:    07/31/24 1047 07/31/24 1129   BP: 142/70 132/80   BP Location: Right arm    Patient Position: Sitting    Pulse: 74    Weight: 73 kg (161 lb)    Height: 1.549 m (5' 1\")         Objective   Physical Exam  Constitutional:       Appearance: Normal appearance.   HENT:      Nose: Nose normal.   Neck:      Vascular: No carotid bruit.   Cardiovascular:      Rate and Rhythm: Normal rate. Rhythm irregularly irregular.      Pulses: Normal pulses.      Heart sounds: Murmur heard.      Systolic murmur is present with a grade of 2/6.   Pulmonary:      Effort: Pulmonary effort is normal.   Abdominal:      General: Bowel sounds are normal.      Palpations: Abdomen is soft.   Musculoskeletal:         General: Normal range of motion.      Cervical back: Normal range of motion.      Right lower leg: No edema.      Left lower leg: No edema.   Skin:     General: Skin is warm and dry. "   Neurological:      General: No focal deficit present.      Mental Status: She is alert.   Psychiatric:         Mood and Affect: Mood normal.         Behavior: Behavior normal.         Thought Content: Thought content normal.         Judgment: Judgment normal.         Allergies  Amiodarone, Codeine, Sulfamethoxazole, and Tramadol     Current Medications    Current Outpatient Medications:     acetaminophen (Tylenol) 325 mg tablet, Take 2 tablets (650 mg) by mouth 2 times a day as needed for mild pain (1 - 3)., Disp: , Rfl:     amLODIPine (Norvasc) 5 mg tablet, Take 1 tablet (5 mg) by mouth once daily., Disp: 90 tablet, Rfl: 3    aspirin 81 mg EC tablet, Take 1 tablet (81 mg) by mouth 1 (one) time per week., Disp: , Rfl:     atorvastatin (Lipitor) 20 mg tablet, Take 1 tablet (20 mg) by mouth once daily at bedtime., Disp: 90 tablet, Rfl: 3    cloNIDine (Catapres) 0.1 mg tablet, Take 1 tablet (0.1 mg) by mouth early in the morning.., Disp: 90 tablet, Rfl: 3    diphenhydrAMINE (BENADryl) 50 mg capsule, Take 1 capsule (50 mg) by mouth once daily as needed for itching., Disp: , Rfl:     famotidine (Pepcid) 40 mg tablet, Take 1 tablet (40 mg) by mouth once daily at bedtime., Disp: , Rfl:     furosemide (Lasix) 40 mg tablet, Take 1 tablet (40 mg) by mouth once daily., Disp: , Rfl:     gabapentin (Neurontin) 300 mg capsule, Take 1 capsule (300 mg) by mouth once daily at bedtime., Disp: , Rfl:     levothyroxine (Synthroid, Levoxyl) 112 mcg tablet, Take 1 tablet (112 mcg) by mouth once daily. as directed, Disp: 90 tablet, Rfl: 3    metoprolol succinate XL (Toprol-XL) 100 mg 24 hr tablet, Take 1 tablet (100 mg) by mouth once daily. Do not crush or chew., Disp: , Rfl:     multivit-min/ferrous fumarate (MULTI VITAMIN ORAL), Take 1 tablet by mouth once daily., Disp: , Rfl:     spironolactone (Aldactone) 25 mg tablet, Take 0.5 tablets (12.5 mg) by mouth once daily., Disp: , Rfl:     Tresiba FlexTouch U-200 200 unit/mL (3 mL)  injection, INJECT 10 - 20 UNITS SUBCUTANEOUSLY ONCE DAILY, Disp: , Rfl:     ursodiol (Actigall) 300 mg capsule, Take 1 capsule (300 mg) by mouth 2 times a day., Disp: , Rfl:     warfarin (Coumadin) 1 mg tablet, Take 1 tablet (1 mg) by mouth. Take as directed per After Visit Summary., Disp: , Rfl:     valsartan (Diovan) 40 mg tablet, Take 1 tablet (40 mg) by mouth 2 times a day., Disp: 180 tablet, Rfl: 3                     Assessment/Plan   1. Chronic systolic heart failure (Multi)  Follow Up In Cardiology      2. Cardiomyopathy, unspecified type (Multi)        3. Nonrheumatic aortic valve stenosis  Follow Up In Cardiology    Referral to Valve and Structural Heart Program      4. Moderate mitral regurgitation        5. Moderate pulmonary hypertension (Multi)        6. Arteriosclerosis of coronary artery        7. Permanent atrial fibrillation (Multi)        8. Sick sinus syndrome (Multi)        9. Cardiac pacemaker in situ        10. Supraventricular tachycardia (CMS-HCC)        11. Essential hypertension  valsartan (Diovan) 40 mg tablet      12. Hyperlipidemia, unspecified hyperlipidemia type        13. Stage 3 chronic kidney disease, unspecified whether stage 3a or 3b CKD (Multi)        14. Never smoked tobacco                 Scribe Attestation  By signing my name below, IKalie LPN, Scribe   attest that this documentation has been prepared under the direction and in the presence of Modesta Carver MD.     Provider Attestation - Scribe documentation    All medical record entries made by the Scribe were at my direction and personally dictated by me. I have reviewed the chart and agree that the record accurately reflects my personal performance of the history, physical exam, discussion and plan.

## 2024-08-05 ENCOUNTER — TELEPHONE (OUTPATIENT)
Dept: CARDIOLOGY | Facility: HOSPITAL | Age: 84
End: 2024-08-05
Payer: MEDICARE

## 2024-08-05 DIAGNOSIS — I35.0 NONRHEUMATIC AORTIC VALVE STENOSIS: Primary | ICD-10-CM

## 2024-08-06 NOTE — PROGRESS NOTES
Referral from Dr. Carver, Dr. Mendieta. Cayla comes to discuss treatment options for aortic valve stenosis. Experiencing fatigue, pratt. History of a fib on coumadin and asa, htn, hld, dm, status post pacemaker. Chelsie Malcolm, RN   PCP: Dr. Bartlett  Cards: Dr. Carver     PMH:   Specialty Problems                  Cardiology Problems     Arteriosclerosis of coronary artery           Essential hypertension           Hyperlipidemia           Moderate mitral regurgitation           Moderate pulmonary hypertension (Multi)           Nonrheumatic aortic valve stenosis           Permanent atrial fibrillation (Multi)           Sick sinus syndrome (Multi)           Supraventricular tachycardia (CMS-HCC)           Cardiomyopathy (Multi)           Chronic systolic heart failure (Multi)               HPI     84 y.o. y/o female with PMH of presents for evaluation of severe, symptomatic aortic stenosis.  Patient relates a one month history of worsening fatigue, SOB, PRATT.  Refers overt orthopnea, PND, exertional CP.  Has occasional dizziness.  Denies syncope or presyncope.  Denies increased abdominal girth, edema.  Gradually doing less and less activity secondary to symptoms.  Nowadays she can't leave the wheel chair, needs help to do daily activities, walk around the house. She use to be independent but have been extremely symptomatic on the last month. Went to ER yesterday for fatigue and SOB, but the diagnostic was UTI and she left with antibiotic prescription.    Full 14 point ROS complete and negative except as noted above.      Echo: 30% EF , mod-severe aortic stenosis, AV mean gradient 40, AV Vmax 4.6, BENITA 0.54   EKG: Pending (patient is chronically on Afib, last EKG on chart was from 2023)     TAVR Workup:   - NYHA: III-IV  - LHC: pend  - CT TAVR: 08/07/2024 no contrast  - dental clearance: edentulous      EFT 2/5 (couldn't stand up alone)  STS      Procedure Type: Isolated AVR  PERIOPERATIVE OUTCOMEESTIMATE %  Operative  Vmrasnvlm25.7%  Morbidity & Fuacdnmbf18.5%  Stroke2.07%  Renal Xrrftxv84%  Reoperation6.62%  Prolonged Nbpnvclavry76.1%  Deep Sternal Wound Infection0.077%  Long Hospital Stay (>14 days)16.7%  Short Hospital Stay (<6 days)*12.1%        Social History           Tobacco Use    Smoking status: Never    Smokeless tobacco: Never   Substance Use Topics    Alcohol use: Never         Family History   No family history on file.         RX Allergies        Allergies   Allergen Reactions    Amiodarone Unknown    Codeine Nausea Only    Sulfamethoxazole Hives    Tramadol Itching                 Current Outpatient Medications   Medication Instructions    acetaminophen (Tylenol) 325 mg tablet 2 tablets, oral, 2 times daily PRN    amLODIPine (NORVASC) 5 mg, oral, Daily    aspirin 81 mg, oral, Once Weekly    atorvastatin (LIPITOR) 20 mg, oral, Nightly    cloNIDine (CATAPRES) 0.1 mg, oral, Daily    diphenhydrAMINE (BENADRYL) 50 mg, oral, Daily PRN    famotidine (Pepcid) 40 mg tablet 1 tablet, oral, Nightly    furosemide (LASIX) 40 mg, oral, Daily    gabapentin (NEURONTIN) 300 mg, oral, Nightly    levothyroxine (SYNTHROID, LEVOXYL) 112 mcg, oral, Daily, as directed    metoprolol succinate XL (TOPROL-XL) 100 mg, oral, Daily, Do not crush or chew.    multivit-min/ferrous fumarate (MULTI VITAMIN ORAL) 1 tablet, oral, Daily    spironolactone (ALDACTONE) 12.5 mg, oral, Daily    Tresiba FlexTouch U-200 200 unit/mL (3 mL) injection INJECT 10 - 20 UNITS SUBCUTANEOUSLY ONCE DAILY    ursodiol (ACTIGALL) 300 mg, oral, 2 times daily    valsartan (DIOVAN) 40 mg, oral, 2 times daily    warfarin (COUMADIN) 1 mg, oral, Take as directed per After Visit Summary.             Vitals:     08/07/24 1111   BP: 95/52   Pulse: 78   SpO2: 95%         Physical Exam:     General:  Alert, calm, well-developed, frail, comes at the clinic in a wheel chair.  HEENT:  Pupils equal, round, reactive to light and accommodation. Extraocular movements   Neck:  Supple,  without lymphadenopathy or thyromegaly. No carotid bruits.  Lymph:  No axillary, cervical, supraclavicular, pre-auricular, submental, or occipital lymphadenopathy,  Cardiovascular:  Regular rate and rhythm, with normal S1 and S2. Aortic murmurs 3/6, no rubs or gallops. No JVD. 2+ pulses bilaterally - dorsalis pedis and radial.  Lungs:  lung clear. No accessory muscle use or cyanosis. Crackles on lower quadrants   Abdomen:  Normoactive bowel sounds. Soft, flat, non-tender, and non-distended. No hepatosplenomegaly; liver span approximately 10 cm.  Skin:  Warm, dry, well-perfused. No rashes or other lesions.  Extremities:  2+ pulses in upper and lower extremities. Lower extremity edema ++/4; legs are symmetric in appearance.  Neuro:  Alert and oriented to person, place, and time. Able to communicate well. 5/5 strength in all extremities bilaterally. Sensation intact in all extremities. Normal gait.                          KCCQ Questionnaire        1  Heart failure affects different people in different ways. Some feel shortness of breath while others feel fatigue. Please indicate how much you are limited by heart failure (shortness of breath or fatigue) in your ability to do the following activities over the past 2 weeks. PRE PROCEDURE     A.) Showering/bathing  2. Quite a bit  B.) Walking 1 block on level ground 1. Extremely  C.) Hurrying or Jogging   1. Extremely     2.  Over the past 2 weeks, how many times did you have swelling in your feet, ankles or legs when you woke up in the morning? 2. 3 or more times per week but not every day     3.  Over the past 2 weeks, on average, how many times has fatigue limited your ability to do what you wanted? 1. All the time     4.  Over the past 2 weeks, on average, how many times has shortness of breath limited your ability to do what you wanted? 1. All the time     5.  Over the past 2 weeks, on average, how many times have you been forced to sleep sitting up in a chair or  with at least 3 pillows to prop you up because of shortness of breath? Never     6. Over the past 2 weeks, how much has your heart failure limited your enjoyment of life? It has extremely limited my enjoyment of life     7. If you had to spend the rest of your life with your heart failure the way it is right now, how would you feel about this? 1. Not at all      8. How much does your heart failure affect your lifestyle? Please indicate how your heart failure may have limited yourparticipation in the following activities over the past 2 weeks     A.)  Hobbies, recreational activities  2. Limited quite a bit     B.) Working or doing household chores  2. Limited quite a bit     C.) Visiting family or friends out of your home  2. Limited quite a bit     5 Meter Walk unable to walk seconds     Impression:      84 y.o. y/o female with PMH of presents for evaluation of severe, symptomatic aortic stenosis.  Patient relates a one month history of worsening fatigue, SOB, PAUL.  Nowadays she can't leave the wheel chair, needs help to do daily activities, walk around the house. She use to be independent but have been extremely symptomatic on the last month. Went to ER yesterday for fatigue and SOB, but the diagnostic was UTI and she left with antibiotic prescription.       Plan:   We will get CT TAVR with no contrast  We need to import her C from 12/2023     The overall decision regarding the best treatment for this condition is complex.  We discussed options of both surgical aortic valve replacement and transcatheter aortic valve replacement along with risks and benefits involved with both of them in detail.     We discussed all the risks associated with the procedure, including but not limited to stroke, MI, pericardial tamponade, vascular complications, infection and death were discussed with the patient. The risk of needing a permament pacemaker was also discussed in detail. The patient verbalized understanding and decided  to proceed with the procedure.      We will discuss this patient's case at our Valve Team meeting with representatives from [facility]. Our nurse navigators will contact patient with further diagnostic needs and formal plan.

## 2024-08-07 ENCOUNTER — OFFICE VISIT (OUTPATIENT)
Dept: CARDIOLOGY | Facility: HOSPITAL | Age: 84
End: 2024-08-07
Payer: MEDICARE

## 2024-08-07 ENCOUNTER — OFFICE VISIT (OUTPATIENT)
Dept: CARDIAC SURGERY | Facility: HOSPITAL | Age: 84
End: 2024-08-07
Payer: MEDICARE

## 2024-08-07 ENCOUNTER — HOSPITAL ENCOUNTER (OUTPATIENT)
Dept: RADIOLOGY | Facility: HOSPITAL | Age: 84
Discharge: HOME | End: 2024-08-07
Payer: MEDICARE

## 2024-08-07 VITALS
OXYGEN SATURATION: 95 % | DIASTOLIC BLOOD PRESSURE: 52 MMHG | SYSTOLIC BLOOD PRESSURE: 95 MMHG | BODY MASS INDEX: 29.27 KG/M2 | HEART RATE: 78 BPM | WEIGHT: 155 LBS | HEIGHT: 61 IN

## 2024-08-07 DIAGNOSIS — I35.0 NONRHEUMATIC AORTIC VALVE STENOSIS: ICD-10-CM

## 2024-08-07 PROCEDURE — 99215 OFFICE O/P EST HI 40 MIN: CPT | Performed by: THORACIC SURGERY (CARDIOTHORACIC VASCULAR SURGERY)

## 2024-08-07 PROCEDURE — 3074F SYST BP LT 130 MM HG: CPT | Performed by: INTERNAL MEDICINE

## 2024-08-07 PROCEDURE — 74176 CT ABD & PELVIS W/O CONTRAST: CPT

## 2024-08-07 PROCEDURE — 71250 CT THORAX DX C-: CPT | Performed by: RADIOLOGY

## 2024-08-07 PROCEDURE — 99214 OFFICE O/P EST MOD 30 MIN: CPT | Performed by: INTERNAL MEDICINE

## 2024-08-07 PROCEDURE — 99205 OFFICE O/P NEW HI 60 MIN: CPT | Performed by: THORACIC SURGERY (CARDIOTHORACIC VASCULAR SURGERY)

## 2024-08-07 PROCEDURE — 1126F AMNT PAIN NOTED NONE PRSNT: CPT | Performed by: INTERNAL MEDICINE

## 2024-08-07 PROCEDURE — 74176 CT ABD & PELVIS W/O CONTRAST: CPT | Performed by: RADIOLOGY

## 2024-08-07 PROCEDURE — 1159F MED LIST DOCD IN RCRD: CPT | Performed by: INTERNAL MEDICINE

## 2024-08-07 PROCEDURE — 3078F DIAST BP <80 MM HG: CPT | Performed by: INTERNAL MEDICINE

## 2024-08-07 PROCEDURE — 99204 OFFICE O/P NEW MOD 45 MIN: CPT | Performed by: INTERNAL MEDICINE

## 2024-08-07 ASSESSMENT — PATIENT HEALTH QUESTIONNAIRE - PHQ9
SUM OF ALL RESPONSES TO PHQ9 QUESTIONS 1 AND 2: 0
2. FEELING DOWN, DEPRESSED OR HOPELESS: NOT AT ALL
1. LITTLE INTEREST OR PLEASURE IN DOING THINGS: NOT AT ALL

## 2024-08-07 ASSESSMENT — COLUMBIA-SUICIDE SEVERITY RATING SCALE - C-SSRS
6. HAVE YOU EVER DONE ANYTHING, STARTED TO DO ANYTHING, OR PREPARED TO DO ANYTHING TO END YOUR LIFE?: NO
1. IN THE PAST MONTH, HAVE YOU WISHED YOU WERE DEAD OR WISHED YOU COULD GO TO SLEEP AND NOT WAKE UP?: NO
2. HAVE YOU ACTUALLY HAD ANY THOUGHTS OF KILLING YOURSELF?: NO

## 2024-08-07 ASSESSMENT — PAIN SCALES - GENERAL: PAINLEVEL: 0-NO PAIN

## 2024-08-07 NOTE — PROGRESS NOTES
KCCQ Questionnaire      1  Heart failure affects different people in different ways. Some feel shortness of breath while others feel fatigue. Please indicate how much you are limited by heart failure (shortness of breath or fatigue) in your ability to do the following activities over the past 2 weeks. PRE PROCEDURE    A.) Showering/bathing  2. Quite a bit  B.) Walking 1 block on level ground 1. Extremely  C.) Hurrying or Jogging   1. Extremely    2.  Over the past 2 weeks, how many times did you have swelling in your feet, ankles or legs when you woke up in the morning? 2. 3 or more times per week but not every day    3.  Over the past 2 weeks, on average, how many times has fatigue limited your ability to do what you wanted? 1. All the time    4.  Over the past 2 weeks, on average, how many times has shortness of breath limited your ability to do what you wanted? 1. All the time    5.  Over the past 2 weeks, on average, how many times have you been forced to sleep sitting up in a chair or with at least 3 pillows to prop you up because of shortness of breath? Never    6. Over the past 2 weeks, how much has your heart failure limited your enjoyment of life? It has extremely limited my enjoyment of life    7. If you had to spend the rest of your life with your heart failure the way it is right now, how would you feel about this? 1. Not at all     8. How much does your heart failure affect your lifestyle? Please indicate how your heart failure may have limited yourparticipation in the following activities over the past 2 weeks    A.)  Hobbies, recreational activities  2. Limited quite a bit    B.) Working or doing household chores  2. Limited quite a bit    C.) Visiting family or friends out of your home  2. Limited quite a bit    5 Meter Walk unable to walk seconds

## 2024-08-08 NOTE — PROGRESS NOTES
PCP: Dr. Bartlett  Cards: Dr. Carver    PMH:   Specialty Problems          Cardiology Problems    Arteriosclerosis of coronary artery        Essential hypertension        Hyperlipidemia        Moderate mitral regurgitation        Moderate pulmonary hypertension (Multi)        Nonrheumatic aortic valve stenosis        Permanent atrial fibrillation (Multi)        Sick sinus syndrome (Multi)        Supraventricular tachycardia (CMS-HCC)        Cardiomyopathy (Multi)        Chronic systolic heart failure (Multi)            HPI    84 y.o. y/o female with PMH of presents for evaluation of severe, symptomatic aortic stenosis.  Patient relates a one month history of worsening fatigue, SOB, PAUL.  Refers overt orthopnea, PND, exertional CP.  Has occasional dizziness.  Denies syncope or presyncope.  Denies increased abdominal girth, edema.  Gradually doing less and less activity secondary to symptoms.  Nowadays she can't leave the wheel chair, needs help to do daily activities, walk around the house. She use to be independent but have been extremely symptomatic on the last month. Went to ER yesterday for fatigue and SOB, but the diagnostic was UTI and she left with antibiotic prescription.    Full 14 point ROS complete and negative except as noted above.     Echo: 30% EF , mod-severe aortic stenosis, AV mean gradient 40, AV Vmax 4.6, BENITA 0.54   EKG: Pending (patient is chronically on Afib, last EKG on chart was from 2023)    TAVR Workup:   - NYHA: III-IV  - LHC: pend  - CT TAVR: 08/07/2024 no contrast  - dental clearance: edentulous     EFT 2/5 (couldn't stand up alone)  STS     Procedure Type: Isolated AVR  PERIOPERATIVE OUTCOME ESTIMATE %  Operative Mortality 22.7%  Morbidity & Mortality 33.5%  Stroke 2.07%  Renal Failure 21%  Reoperation 6.62%  Prolonged Ventilation 24.1%  Deep Sternal Wound Infection 0.077%  Long Hospital Stay (>14 days) 16.7%  Short Hospital Stay (<6 days)* 12.1%      Social History     Tobacco Use     Smoking status: Never    Smokeless tobacco: Never   Substance Use Topics    Alcohol use: Never        No family history on file.     Allergies   Allergen Reactions    Amiodarone Unknown    Codeine Nausea Only    Sulfamethoxazole Hives    Tramadol Itching        Current Outpatient Medications   Medication Instructions    acetaminophen (Tylenol) 325 mg tablet 2 tablets, oral, 2 times daily PRN    amLODIPine (NORVASC) 5 mg, oral, Daily    aspirin 81 mg, oral, Once Weekly    atorvastatin (LIPITOR) 20 mg, oral, Nightly    cloNIDine (CATAPRES) 0.1 mg, oral, Daily    diphenhydrAMINE (BENADRYL) 50 mg, oral, Daily PRN    famotidine (Pepcid) 40 mg tablet 1 tablet, oral, Nightly    furosemide (LASIX) 40 mg, oral, Daily    gabapentin (NEURONTIN) 300 mg, oral, Nightly    levothyroxine (SYNTHROID, LEVOXYL) 112 mcg, oral, Daily, as directed    metoprolol succinate XL (TOPROL-XL) 100 mg, oral, Daily, Do not crush or chew.    multivit-min/ferrous fumarate (MULTI VITAMIN ORAL) 1 tablet, oral, Daily    spironolactone (ALDACTONE) 12.5 mg, oral, Daily    Tresiba FlexTouch U-200 200 unit/mL (3 mL) injection INJECT 10 - 20 UNITS SUBCUTANEOUSLY ONCE DAILY    ursodiol (ACTIGALL) 300 mg, oral, 2 times daily    valsartan (DIOVAN) 40 mg, oral, 2 times daily    warfarin (COUMADIN) 1 mg, oral, Take as directed per After Visit Summary.        Vitals:    08/07/24 1111   BP: 95/52   Pulse: 78   SpO2: 95%        Physical Exam:    General:  Alert, calm, well-developed, frail, comes at the clinic in a wheel chair.  HEENT:  Pupils equal, round, reactive to light and accommodation. Extraocular movements   Neck:  Supple, without lymphadenopathy or thyromegaly. No carotid bruits.  Lymph:  No axillary, cervical, supraclavicular, pre-auricular, submental, or occipital lymphadenopathy,  Cardiovascular:  Regular rate and rhythm, with normal S1 and S2. Aortic murmurs 3/6, no rubs or gallops. No JVD. 2+ pulses bilaterally - dorsalis pedis and  radial.  Lungs:  lung clear. No accessory muscle use or cyanosis. Crackles on lower quadrants   Abdomen:  Normoactive bowel sounds. Soft, flat, non-tender, and non-distended. No hepatosplenomegaly; liver span approximately 10 cm.  Skin:  Warm, dry, well-perfused. No rashes or other lesions.  Extremities:  2+ pulses in upper and lower extremities. Lower extremity edema ++/4; legs are symmetric in appearance.  Neuro:  Alert and oriented to person, place, and time. Able to communicate well. 5/5 strength in all extremities bilaterally. Sensation intact in all extremities. Normal gait.             KCCQ Questionnaire        1  Heart failure affects different people in different ways. Some feel shortness of breath while others feel fatigue. Please indicate how much you are limited by heart failure (shortness of breath or fatigue) in your ability to do the following activities over the past 2 weeks. PRE PROCEDURE     A.) Showering/bathing  2. Quite a bit  B.) Walking 1 block on level ground 1. Extremely  C.) Hurrying or Jogging   1. Extremely     2.  Over the past 2 weeks, how many times did you have swelling in your feet, ankles or legs when you woke up in the morning? 2. 3 or more times per week but not every day     3.  Over the past 2 weeks, on average, how many times has fatigue limited your ability to do what you wanted? 1. All the time     4.  Over the past 2 weeks, on average, how many times has shortness of breath limited your ability to do what you wanted? 1. All the time     5.  Over the past 2 weeks, on average, how many times have you been forced to sleep sitting up in a chair or with at least 3 pillows to prop you up because of shortness of breath? Never     6. Over the past 2 weeks, how much has your heart failure limited your enjoyment of life? It has extremely limited my enjoyment of life     7. If you had to spend the rest of your life with your heart failure the way it is right now, how would you feel  about this? 1. Not at all      8. How much does your heart failure affect your lifestyle? Please indicate how your heart failure may have limited yourparticipation in the following activities over the past 2 weeks     A.)  Hobbies, recreational activities  2. Limited quite a bit     B.) Working or doing household chores  2. Limited quite a bit     C.) Visiting family or friends out of your home  2. Limited quite a bit     5 Meter Walk unable to walk seconds    Impression:     84 y.o. y/o female with PMH of presents for evaluation of severe, symptomatic aortic stenosis.  Patient relates a one month history of worsening fatigue, SOB, PAUL.  Nowadays she can't leave the wheel chair, needs help to do daily activities, walk around the house. She use to be independent but have been extremely symptomatic on the last month. Went to ER yesterday for fatigue and SOB, but the diagnostic was UTI and she left with antibiotic prescription.      Plan:   We will get CT TAVR with no contrast  We need to import her LHC from 12/2023    The overall decision regarding the best treatment for this condition is complex.  We discussed options of both surgical aortic valve replacement and transcatheter aortic valve replacement along with risks and benefits involved with both of them in detail.    We discussed all the risks associated with the procedure, including but not limited to stroke, MI, pericardial tamponade, vascular complications, infection and death were discussed with the patient. The risk of needing a permament pacemaker was also discussed in detail. The patient verbalized understanding and decided to proceed with the procedure.     We will discuss this patient's case at our Valve Team meeting with representatives from [facility]. Our nurse navigators will contact patient with further diagnostic needs and formal plan.

## 2024-08-28 ENCOUNTER — PREP FOR PROCEDURE (OUTPATIENT)
Dept: GASTROENTEROLOGY | Age: 84
End: 2024-08-28

## 2024-08-28 ENCOUNTER — ANESTHESIA (OUTPATIENT)
Dept: ENDOSCOPY | Age: 84
End: 2024-08-28

## 2024-08-28 ENCOUNTER — ANESTHESIA EVENT (OUTPATIENT)
Dept: ENDOSCOPY | Age: 84
End: 2024-08-28

## 2024-08-28 PROCEDURE — 2500000003 HC RX 250 WO HCPCS: Performed by: NURSE ANESTHETIST, CERTIFIED REGISTERED

## 2024-08-28 PROCEDURE — 6360000002 HC RX W HCPCS: Performed by: NURSE ANESTHETIST, CERTIFIED REGISTERED

## 2024-08-28 RX ORDER — PROPOFOL 10 MG/ML
INJECTION, EMULSION INTRAVENOUS PRN
Status: DISCONTINUED | OUTPATIENT
Start: 2024-08-28 | End: 2024-08-28 | Stop reason: SDUPTHER

## 2024-08-28 RX ORDER — SODIUM CHLORIDE 0.9 % (FLUSH) 0.9 %
5-40 SYRINGE (ML) INJECTION EVERY 12 HOURS SCHEDULED
Status: CANCELLED | OUTPATIENT
Start: 2024-08-28

## 2024-08-28 RX ORDER — SODIUM CHLORIDE 0.9 % (FLUSH) 0.9 %
5-40 SYRINGE (ML) INJECTION PRN
Status: CANCELLED | OUTPATIENT
Start: 2024-08-28

## 2024-08-28 RX ORDER — LIDOCAINE HYDROCHLORIDE 20 MG/ML
INJECTION, SOLUTION EPIDURAL; INFILTRATION; INTRACAUDAL; PERINEURAL PRN
Status: DISCONTINUED | OUTPATIENT
Start: 2024-08-28 | End: 2024-08-28 | Stop reason: SDUPTHER

## 2024-08-28 RX ORDER — SODIUM CHLORIDE 9 MG/ML
INJECTION, SOLUTION INTRAVENOUS CONTINUOUS
Status: CANCELLED | OUTPATIENT
Start: 2024-08-28

## 2024-08-28 RX ORDER — SODIUM CHLORIDE 9 MG/ML
INJECTION, SOLUTION INTRAVENOUS PRN
Status: CANCELLED | OUTPATIENT
Start: 2024-08-28

## 2024-08-28 RX ORDER — ONDANSETRON 2 MG/ML
INJECTION INTRAMUSCULAR; INTRAVENOUS PRN
Status: DISCONTINUED | OUTPATIENT
Start: 2024-08-28 | End: 2024-08-28 | Stop reason: SDUPTHER

## 2024-08-28 RX ADMIN — PROPOFOL 50 MG: 10 INJECTION, EMULSION INTRAVENOUS at 10:05

## 2024-08-28 RX ADMIN — PHENYLEPHRINE HYDROCHLORIDE 200 MCG: 10 INJECTION INTRAVENOUS at 09:55

## 2024-08-28 RX ADMIN — ONDANSETRON 4 MG: 2 INJECTION INTRAMUSCULAR; INTRAVENOUS at 10:44

## 2024-08-28 RX ADMIN — PROPOFOL 70 MG: 10 INJECTION, EMULSION INTRAVENOUS at 09:30

## 2024-08-28 RX ADMIN — PHENYLEPHRINE HYDROCHLORIDE 200 MCG: 10 INJECTION INTRAVENOUS at 10:04

## 2024-08-28 RX ADMIN — PROPOFOL 50 MG: 10 INJECTION, EMULSION INTRAVENOUS at 09:37

## 2024-08-28 RX ADMIN — LIDOCAINE HYDROCHLORIDE 50 MG: 20 INJECTION, SOLUTION EPIDURAL; INFILTRATION; INTRACAUDAL; PERINEURAL at 09:30

## 2024-08-28 RX ADMIN — PROPOFOL 25 MG: 10 INJECTION, EMULSION INTRAVENOUS at 09:32

## 2024-08-28 RX ADMIN — PROPOFOL 50 MG: 10 INJECTION, EMULSION INTRAVENOUS at 09:43

## 2024-08-28 RX ADMIN — PROPOFOL 50 MG: 10 INJECTION, EMULSION INTRAVENOUS at 09:57

## 2024-08-28 RX ADMIN — PROPOFOL 50 MG: 10 INJECTION, EMULSION INTRAVENOUS at 09:50

## 2024-08-28 NOTE — ANESTHESIA PRE PROCEDURE
Department of Anesthesiology  Preprocedure Note       Name:  Lorenza Zamorano   Age:  84 y.o.  :  1940                                          MRN:  [id number]         Date:  2024      Surgeon: Surgeon(s):  Sánchez Castillo MD    Procedure: Procedure(s):  ENDOSCOPIC ULTRASOUND    Medications prior to admission:   Prior to Admission medications    Medication Sig Start Date End Date Taking? Authorizing Provider   acetaminophen (TYLENOL) 500 MG tablet Take 1 tablet by mouth every 8 hours as needed for Pain    ProviderNilton MD   amLODIPine (NORVASC) 5 MG tablet Take 1 tablet by mouth daily    Nilton Ch MD   aspirin 81 MG chewable tablet Take 1 tablet by mouth daily    Nilton Ch MD   atorvastatin (LIPITOR) 20 MG tablet Take 1 tablet by mouth daily    Nilton Ch MD   cloNIDine (CATAPRES) 0.1 MG tablet Take 1 tablet by mouth daily    Nilton Ch MD   docusate sodium (COLACE) 100 MG capsule Take 1 capsule by mouth 2 times daily    Nilton Ch MD   diphenhydrAMINE (BENADRYL) 25 MG capsule Take 1 capsule by mouth nightly as needed for Sleep    ProviderNilton MD   levothyroxine (SYNTHROID) 112 MCG tablet Take 1 tablet by mouth Daily    Nilton Ch MD   furosemide (LASIX) 40 MG tablet Take 1 tablet by mouth daily as needed For edema    Nilton Ch MD   metoprolol succinate (TOPROL XL) 25 MG extended release tablet Take 1 tablet by mouth daily    Nilton Ch MD   Multiple Vitamins-Minerals (THERAPEUTIC MULTIVITAMIN-MINERALS) tablet Take 1 tablet by mouth daily    ProviderNilton MD       Current medications:    Current Facility-Administered Medications   Medication Dose Route Frequency Provider Last Rate Last Admin    sodium chloride 0.9 % infusion                Allergies:    Allergies   Allergen Reactions    Tramadol Shortness Of Breath    Amiodarone     Lorazepam     Oxycodone Itching and Hallucinations    Sotalol

## 2024-08-28 NOTE — ADDENDUM NOTE
Addendum  created 08/28/24 1044 by Emerson Almeida APRN - [place]    Intraprocedure Meds edited, Orders acknowledged in Narrator

## 2024-08-28 NOTE — ANESTHESIA POSTPROCEDURE EVALUATION
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    Parish Phase I: Parish Score: 10    Parish Phase II:      Anesthesia Post Evaluation    Patient location during evaluation: bedside  Level of consciousness: awake  Airway patency: patent  Nausea & Vomiting: no nausea and no vomiting  Cardiovascular status: hemodynamically stable  Respiratory status: acceptable  Hydration status: euvolemic  Pain management: adequate        No notable events documented.

## 2024-09-13 DIAGNOSIS — K86.2 PANCREAS CYST: Primary | ICD-10-CM

## 2024-09-20 ENCOUNTER — TELEPHONE (OUTPATIENT)
Dept: GASTROENTEROLOGY | Age: 84
End: 2024-09-20

## 2024-09-26 ENCOUNTER — OFFICE VISIT (OUTPATIENT)
Dept: GASTROENTEROLOGY | Age: 84
End: 2024-09-26
Payer: COMMERCIAL

## 2024-09-26 VITALS
DIASTOLIC BLOOD PRESSURE: 90 MMHG | WEIGHT: 160 LBS | HEART RATE: 97 BPM | OXYGEN SATURATION: 99 % | SYSTOLIC BLOOD PRESSURE: 114 MMHG | HEIGHT: 61 IN | BODY MASS INDEX: 30.21 KG/M2

## 2024-09-26 DIAGNOSIS — K86.9 LESION OF PANCREAS: Primary | ICD-10-CM

## 2024-09-26 DIAGNOSIS — K21.9 GASTROESOPHAGEAL REFLUX DISEASE WITHOUT ESOPHAGITIS: ICD-10-CM

## 2024-09-26 DIAGNOSIS — K44.9 HIATAL HERNIA: ICD-10-CM

## 2024-09-26 DIAGNOSIS — K83.8 DILATED BILE DUCT: ICD-10-CM

## 2024-09-26 PROCEDURE — 99213 OFFICE O/P EST LOW 20 MIN: CPT | Performed by: NURSE PRACTITIONER

## 2024-09-26 PROCEDURE — 1123F ACP DISCUSS/DSCN MKR DOCD: CPT | Performed by: NURSE PRACTITIONER

## 2024-09-26 RX ORDER — URSODIOL 300 MG/1
CAPSULE ORAL
COMMUNITY
Start: 2023-10-27

## 2024-09-26 RX ORDER — WARFARIN SODIUM 2 MG/1
TABLET ORAL
COMMUNITY
Start: 2024-08-22

## 2024-09-26 RX ORDER — GABAPENTIN 300 MG/1
CAPSULE ORAL
COMMUNITY
Start: 2024-07-18

## 2024-09-26 RX ORDER — ACETAMINOPHEN 325 MG/1
650 TABLET ORAL 2 TIMES DAILY PRN
COMMUNITY

## 2024-09-26 RX ORDER — METOPROLOL SUCCINATE 100 MG/1
100 TABLET, EXTENDED RELEASE ORAL DAILY
COMMUNITY

## 2024-09-26 RX ORDER — VALSARTAN 40 MG/1
40 TABLET ORAL 2 TIMES DAILY
COMMUNITY
Start: 2024-07-18 | End: 2025-07-31

## 2024-09-26 RX ORDER — SPIRONOLACTONE 25 MG/1
TABLET ORAL
COMMUNITY
Start: 2024-07-19

## 2024-09-26 RX ORDER — PANTOPRAZOLE SODIUM 40 MG/1
TABLET, DELAYED RELEASE ORAL
COMMUNITY
Start: 2024-09-23

## 2024-09-26 RX ORDER — LEVOFLOXACIN 750 MG/1
TABLET, FILM COATED ORAL
COMMUNITY
Start: 2024-08-30

## 2024-09-26 RX ORDER — BLOOD SUGAR DIAGNOSTIC
STRIP MISCELLANEOUS
COMMUNITY
Start: 2024-07-19

## 2024-09-26 RX ORDER — WARFARIN SODIUM 1 MG/1
TABLET ORAL
COMMUNITY
Start: 2024-07-23

## 2024-09-26 RX ORDER — WARFARIN SODIUM 4 MG/1
TABLET ORAL
COMMUNITY
Start: 2024-09-17

## 2024-09-26 ASSESSMENT — ENCOUNTER SYMPTOMS
VOMITING: 0
GASTROINTESTINAL NEGATIVE: 1
ABDOMINAL DISTENTION: 0
NAUSEA: 0
TROUBLE SWALLOWING: 0
RECTAL PAIN: 0
DIARRHEA: 0
BLOOD IN STOOL: 0
ABDOMINAL PAIN: 0
CONSTIPATION: 0
ANAL BLEEDING: 0

## 2024-10-21 ENCOUNTER — TELEPHONE (OUTPATIENT)
Dept: CARDIOLOGY | Facility: HOSPITAL | Age: 84
End: 2024-10-21
Payer: MEDICARE

## 2024-10-21 NOTE — TELEPHONE ENCOUNTER
Called & spoke with Daughter, Elise. She reported her mother is now in nursing home. She reported her memory & health has significantly declined. They have decided not to proceed with Structural Heart. Team was awaiting follow up with CT incidental. Dr. Slavik-Bosworth was awaiting lab work.

## 2025-03-05 ENCOUNTER — APPOINTMENT (OUTPATIENT)
Dept: CARDIOLOGY | Facility: CLINIC | Age: 85
End: 2025-03-05
Payer: MEDICARE